# Patient Record
Sex: FEMALE | URBAN - NONMETROPOLITAN AREA
[De-identification: names, ages, dates, MRNs, and addresses within clinical notes are randomized per-mention and may not be internally consistent; named-entity substitution may affect disease eponyms.]

---

## 2021-07-22 ENCOUNTER — APPOINTMENT (RX ONLY)
Dept: URBAN - NONMETROPOLITAN AREA CLINIC 1 | Facility: CLINIC | Age: 36
Setting detail: DERMATOLOGY
End: 2021-07-22

## 2021-07-22 DIAGNOSIS — Z41.9 ENCOUNTER FOR PROCEDURE FOR PURPOSES OTHER THAN REMEDYING HEALTH STATE, UNSPECIFIED: ICD-10-CM

## 2021-07-22 PROCEDURE — ? COSMETIC CONSULTATION: LHR

## 2021-09-09 ENCOUNTER — APPOINTMENT (RX ONLY)
Dept: URBAN - NONMETROPOLITAN AREA CLINIC 1 | Facility: CLINIC | Age: 36
Setting detail: DERMATOLOGY
End: 2021-09-09

## 2021-10-06 ENCOUNTER — APPOINTMENT (RX ONLY)
Dept: URBAN - NONMETROPOLITAN AREA CLINIC 1 | Facility: CLINIC | Age: 36
Setting detail: DERMATOLOGY
End: 2021-10-06

## 2021-10-06 DIAGNOSIS — Z41.9 ENCOUNTER FOR PROCEDURE FOR PURPOSES OTHER THAN REMEDYING HEALTH STATE, UNSPECIFIED: ICD-10-CM

## 2021-10-06 PROCEDURE — ? LASER HAIR REMOVAL

## 2021-10-06 NOTE — PROCEDURE: LASER HAIR REMOVAL
Cooling: DCD setting
Pulse Duration: 30 ms
Post-Care Instructions: RN reviewed with the patient in detail post-care instructions. \\n\\nOutlined post treatment expectations and down time.  Pt aware of post treatment pain, swelling, and redness, which can last days.  Strict sun avoidance and protection emphasized for 4 weeks prior to and post treatment.  If sun exposure is unavoidable, pt instructed to cover the treatment area with clothing.  \\n\\Beatrice pt concerns and questions addressed; pt verbalized understanding.
Total Pulses: 274
Topical Anesthesia Type: 23% lidocaine, 7% tetracaine
Shaving (Optional): The patient was shaved in the office prior to the procedure
Number Of Prepaid Treatments (Will Not Render If 0): 0
Detail Level: Detailed
Length Of Topical Anesthesia Application (Optional): 15 minutes
Laser Type: diode 810nm
Fluence (Will Not Render If 0): 20
Were Eye Shields Employed?: Yes
Consent: Written consent reviewed by RN and signed by pt. Risks reviewed including but not limited to crusting, scabbing, blistering, scarring, darker or lighter pigmentary change, paradoxical hair regrowth, incomplete removal of hair and infection.  \\n\\nNo guarantees can be made ans results vary patient to patient. \\n\\nPt is aware 6-10 treatments, spaced 6-8 weeks apart, may be required for hair reduction.
Comments: Saftevac smoke evacuator held by pt near treatment area.
Tolerated Procedure (Optional): Tolerated Well
Cooling: chill tip
Spot Size: 9 mm
Pre-Procedure: Pt swished with antiseptic mouthwash for 30 seconds prior to treatment. \\n\\nTopical numbing, if applied, removed with a warm towel prior to procedure.\\n\\nUltrasound gel applied to treatment area.
Post-Procedure Care: Immediate endpoint: perifollicular erythema and edema. \\n\\nPost treatment balm applied and ice offered for pt comfort.
Fluence (Will Not Render If 0): 25
Treatment Number: 1
Price (Use Numbers Only, No Special Characters Or $): 400
Eye Shield Text: Appropriate eye protection placed on pt and pt instructed to keep their eyes closed during the treatment.

## 2021-10-11 ENCOUNTER — APPOINTMENT (RX ONLY)
Dept: URBAN - NONMETROPOLITAN AREA CLINIC 1 | Facility: CLINIC | Age: 36
Setting detail: DERMATOLOGY
End: 2021-10-11

## 2021-10-11 DIAGNOSIS — A60.9 ANOGENITAL HERPESVIRAL INFECTION, UNSPECIFIED: ICD-10-CM

## 2021-10-11 DIAGNOSIS — L01.01 NON-BULLOUS IMPETIGO: ICD-10-CM

## 2021-10-11 PROBLEM — L08.9 LOCAL INFECTION OF THE SKIN AND SUBCUTANEOUS TISSUE, UNSPECIFIED: Status: ACTIVE | Noted: 2021-10-11

## 2021-10-11 PROCEDURE — ? PRESCRIPTION

## 2021-10-11 RX ORDER — VALACYCLOVIR HYDROCHLORIDE 1 G/1
TABLET, FILM COATED ORAL QD
Qty: 30 | Refills: 3 | Status: ERX | COMMUNITY
Start: 2021-10-11

## 2021-10-11 RX ORDER — MUPIROCIN 20 MG/G
1 OINTMENT TOPICAL TID
Qty: 22 | Refills: 1 | Status: ERX | COMMUNITY
Start: 2021-10-11

## 2021-10-11 RX ADMIN — MUPIROCIN 1: 20 OINTMENT TOPICAL at 00:00

## 2021-10-11 RX ADMIN — VALACYCLOVIR HYDROCHLORIDE: 1 TABLET, FILM COATED ORAL at 00:00

## 2021-10-12 ENCOUNTER — APPOINTMENT (RX ONLY)
Dept: URBAN - NONMETROPOLITAN AREA CLINIC 1 | Facility: CLINIC | Age: 36
Setting detail: DERMATOLOGY
End: 2021-10-12

## 2021-10-12 DIAGNOSIS — Z41.9 ENCOUNTER FOR PROCEDURE FOR PURPOSES OTHER THAN REMEDYING HEALTH STATE, UNSPECIFIED: ICD-10-CM
